# Patient Record
Sex: MALE | Race: WHITE | NOT HISPANIC OR LATINO | URBAN - METROPOLITAN AREA
[De-identification: names, ages, dates, MRNs, and addresses within clinical notes are randomized per-mention and may not be internally consistent; named-entity substitution may affect disease eponyms.]

---

## 2019-01-07 ENCOUNTER — EMERGENCY (EMERGENCY)
Facility: HOSPITAL | Age: 44
LOS: 1 days | Discharge: HOME | End: 2019-01-07
Attending: EMERGENCY MEDICINE | Admitting: EMERGENCY MEDICINE
Payer: COMMERCIAL

## 2019-01-07 VITALS
HEART RATE: 62 BPM | RESPIRATION RATE: 18 BRPM | DIASTOLIC BLOOD PRESSURE: 78 MMHG | SYSTOLIC BLOOD PRESSURE: 119 MMHG | OXYGEN SATURATION: 99 %

## 2019-01-07 VITALS
OXYGEN SATURATION: 99 % | HEART RATE: 68 BPM | WEIGHT: 182.1 LBS | RESPIRATION RATE: 15 BRPM | DIASTOLIC BLOOD PRESSURE: 76 MMHG | SYSTOLIC BLOOD PRESSURE: 112 MMHG | HEIGHT: 70 IN | TEMPERATURE: 99 F

## 2019-01-07 LAB
ALBUMIN SERPL ELPH-MCNC: 4.4 G/DL — SIGNIFICANT CHANGE UP (ref 3.3–5)
ALP SERPL-CCNC: 91 U/L — SIGNIFICANT CHANGE UP (ref 40–120)
ALT FLD-CCNC: 46 U/L — SIGNIFICANT CHANGE UP (ref 12–78)
ANION GAP SERPL CALC-SCNC: 7 MMOL/L — SIGNIFICANT CHANGE UP (ref 5–17)
APPEARANCE UR: CLEAR — SIGNIFICANT CHANGE UP
AST SERPL-CCNC: 28 U/L — SIGNIFICANT CHANGE UP (ref 15–37)
BASOPHILS # BLD AUTO: 0.01 K/UL — SIGNIFICANT CHANGE UP (ref 0–0.2)
BASOPHILS NFR BLD AUTO: 0.2 % — SIGNIFICANT CHANGE UP (ref 0–2)
BILIRUB SERPL-MCNC: 0.9 MG/DL — SIGNIFICANT CHANGE UP (ref 0.2–1.2)
BILIRUB UR-MCNC: NEGATIVE — SIGNIFICANT CHANGE UP
BUN SERPL-MCNC: 23 MG/DL — SIGNIFICANT CHANGE UP (ref 7–23)
CALCIUM SERPL-MCNC: 8.9 MG/DL — SIGNIFICANT CHANGE UP (ref 8.5–10.1)
CHLORIDE SERPL-SCNC: 106 MMOL/L — SIGNIFICANT CHANGE UP (ref 96–108)
CO2 SERPL-SCNC: 29 MMOL/L — SIGNIFICANT CHANGE UP (ref 22–31)
COLOR SPEC: YELLOW — SIGNIFICANT CHANGE UP
CREAT SERPL-MCNC: 1 MG/DL — SIGNIFICANT CHANGE UP (ref 0.5–1.3)
DIFF PNL FLD: NEGATIVE — SIGNIFICANT CHANGE UP
EOSINOPHIL # BLD AUTO: 0.02 K/UL — SIGNIFICANT CHANGE UP (ref 0–0.5)
EOSINOPHIL NFR BLD AUTO: 0.3 % — SIGNIFICANT CHANGE UP (ref 0–6)
GLUCOSE SERPL-MCNC: 65 MG/DL — LOW (ref 70–99)
GLUCOSE UR QL: NEGATIVE — SIGNIFICANT CHANGE UP
HCT VFR BLD CALC: 47.1 % — SIGNIFICANT CHANGE UP (ref 39–50)
HGB BLD-MCNC: 16.6 G/DL — SIGNIFICANT CHANGE UP (ref 13–17)
IMM GRANULOCYTES NFR BLD AUTO: 0.2 % — SIGNIFICANT CHANGE UP (ref 0–1.5)
KETONES UR-MCNC: NEGATIVE — SIGNIFICANT CHANGE UP
LEUKOCYTE ESTERASE UR-ACNC: NEGATIVE — SIGNIFICANT CHANGE UP
LYMPHOCYTES # BLD AUTO: 1.63 K/UL — SIGNIFICANT CHANGE UP (ref 1–3.3)
LYMPHOCYTES # BLD AUTO: 25.5 % — SIGNIFICANT CHANGE UP (ref 13–44)
MCHC RBC-ENTMCNC: 30.9 PG — SIGNIFICANT CHANGE UP (ref 27–34)
MCHC RBC-ENTMCNC: 35.2 GM/DL — SIGNIFICANT CHANGE UP (ref 32–36)
MCV RBC AUTO: 87.7 FL — SIGNIFICANT CHANGE UP (ref 80–100)
MONOCYTES # BLD AUTO: 0.34 K/UL — SIGNIFICANT CHANGE UP (ref 0–0.9)
MONOCYTES NFR BLD AUTO: 5.3 % — SIGNIFICANT CHANGE UP (ref 2–14)
NEUTROPHILS # BLD AUTO: 4.37 K/UL — SIGNIFICANT CHANGE UP (ref 1.8–7.4)
NEUTROPHILS NFR BLD AUTO: 68.5 % — SIGNIFICANT CHANGE UP (ref 43–77)
NITRITE UR-MCNC: NEGATIVE — SIGNIFICANT CHANGE UP
NRBC # BLD: 0 /100 WBCS — SIGNIFICANT CHANGE UP (ref 0–0)
PH UR: 6 — SIGNIFICANT CHANGE UP (ref 5–8)
PLATELET # BLD AUTO: 303 K/UL — SIGNIFICANT CHANGE UP (ref 150–400)
POTASSIUM SERPL-MCNC: 4 MMOL/L — SIGNIFICANT CHANGE UP (ref 3.5–5.3)
POTASSIUM SERPL-SCNC: 4 MMOL/L — SIGNIFICANT CHANGE UP (ref 3.5–5.3)
PROT SERPL-MCNC: 8.2 G/DL — SIGNIFICANT CHANGE UP (ref 6–8.3)
PROT UR-MCNC: NEGATIVE — SIGNIFICANT CHANGE UP
RBC # BLD: 5.37 M/UL — SIGNIFICANT CHANGE UP (ref 4.2–5.8)
RBC # FLD: 12.4 % — SIGNIFICANT CHANGE UP (ref 10.3–14.5)
SODIUM SERPL-SCNC: 142 MMOL/L — SIGNIFICANT CHANGE UP (ref 135–145)
SP GR SPEC: 1.01 — SIGNIFICANT CHANGE UP (ref 1.01–1.02)
UROBILINOGEN FLD QL: NEGATIVE — SIGNIFICANT CHANGE UP
WBC # BLD: 6.38 K/UL — SIGNIFICANT CHANGE UP (ref 3.8–10.5)
WBC # FLD AUTO: 6.38 K/UL — SIGNIFICANT CHANGE UP (ref 3.8–10.5)

## 2019-01-07 PROCEDURE — 96374 THER/PROPH/DIAG INJ IV PUSH: CPT

## 2019-01-07 PROCEDURE — 80053 COMPREHEN METABOLIC PANEL: CPT

## 2019-01-07 PROCEDURE — 96361 HYDRATE IV INFUSION ADD-ON: CPT

## 2019-01-07 PROCEDURE — 81003 URINALYSIS AUTO W/O SCOPE: CPT

## 2019-01-07 PROCEDURE — 36415 COLL VENOUS BLD VENIPUNCTURE: CPT

## 2019-01-07 PROCEDURE — 70450 CT HEAD/BRAIN W/O DYE: CPT

## 2019-01-07 PROCEDURE — 85027 COMPLETE CBC AUTOMATED: CPT

## 2019-01-07 PROCEDURE — 93005 ELECTROCARDIOGRAM TRACING: CPT

## 2019-01-07 PROCEDURE — 99284 EMERGENCY DEPT VISIT MOD MDM: CPT | Mod: 25

## 2019-01-07 PROCEDURE — 99284 EMERGENCY DEPT VISIT MOD MDM: CPT

## 2019-01-07 PROCEDURE — 70450 CT HEAD/BRAIN W/O DYE: CPT | Mod: 26

## 2019-01-07 PROCEDURE — 96375 TX/PRO/DX INJ NEW DRUG ADDON: CPT

## 2019-01-07 RX ORDER — SODIUM CHLORIDE 9 MG/ML
3 INJECTION INTRAMUSCULAR; INTRAVENOUS; SUBCUTANEOUS ONCE
Qty: 0 | Refills: 0 | Status: COMPLETED | OUTPATIENT
Start: 2019-01-07 | End: 2019-01-07

## 2019-01-07 RX ORDER — METOCLOPRAMIDE HCL 10 MG
10 TABLET ORAL ONCE
Qty: 0 | Refills: 0 | Status: COMPLETED | OUTPATIENT
Start: 2019-01-07 | End: 2019-01-07

## 2019-01-07 RX ORDER — FAMOTIDINE 10 MG/ML
20 INJECTION INTRAVENOUS ONCE
Qty: 0 | Refills: 0 | Status: COMPLETED | OUTPATIENT
Start: 2019-01-07 | End: 2019-01-07

## 2019-01-07 RX ORDER — SODIUM CHLORIDE 9 MG/ML
1000 INJECTION INTRAMUSCULAR; INTRAVENOUS; SUBCUTANEOUS
Qty: 0 | Refills: 0 | Status: DISCONTINUED | OUTPATIENT
Start: 2019-01-07 | End: 2019-01-11

## 2019-01-07 RX ORDER — SODIUM CHLORIDE 9 MG/ML
1000 INJECTION INTRAMUSCULAR; INTRAVENOUS; SUBCUTANEOUS ONCE
Qty: 0 | Refills: 0 | Status: COMPLETED | OUTPATIENT
Start: 2019-01-07 | End: 2019-01-07

## 2019-01-07 RX ADMIN — SODIUM CHLORIDE 125 MILLILITER(S): 9 INJECTION INTRAMUSCULAR; INTRAVENOUS; SUBCUTANEOUS at 09:36

## 2019-01-07 RX ADMIN — SODIUM CHLORIDE 3 MILLILITER(S): 9 INJECTION INTRAMUSCULAR; INTRAVENOUS; SUBCUTANEOUS at 09:36

## 2019-01-07 RX ADMIN — Medication 10 MILLIGRAM(S): at 09:37

## 2019-01-07 RX ADMIN — FAMOTIDINE 20 MILLIGRAM(S): 10 INJECTION INTRAVENOUS at 09:36

## 2019-01-07 RX ADMIN — SODIUM CHLORIDE 1000 MILLILITER(S): 9 INJECTION INTRAMUSCULAR; INTRAVENOUS; SUBCUTANEOUS at 09:36

## 2019-01-07 RX ADMIN — SODIUM CHLORIDE 1000 MILLILITER(S): 9 INJECTION INTRAMUSCULAR; INTRAVENOUS; SUBCUTANEOUS at 10:38

## 2019-01-07 RX ADMIN — Medication 104 MILLIGRAM(S): at 09:36

## 2019-01-07 NOTE — ED PROVIDER NOTE - OBJECTIVE STATEMENT
While walking into work about 0800 today, felt lightheaded, dizzy and almost passed out, lasted about 1 hour. In the ED, just fetl dizzy and head pressure and blurry vision.   Similar episodes this past September, had neg holter c his cardiologist.   pt ran marathon, about 15 miles a week, and ran 2 miles this past Friday. While walking into work about 0800 today, felt lightheaded, sob, dizzy and almost passed out, lasted about 1 hour. In the ED, just felt dizzy and head pressure and blurry vision.   Similar episodes this past September, had neg holter c his cardiologist.   pt ran marathon, about 15 miles a week, and ran 2 miles this past Friday. While walking into work about 0800 today, felt lightheaded, sob, dizzy and almost passed out, lasted about 1 hour. In the ED, just felt dizzy and head pressure and blurry vision.   Similar episodes this past September, had neg holter c his cardiologist.   pt ran marathon, about 15 miles a week, and ran 2 miles this past Friday.  no other complaint.

## 2019-01-07 NOTE — ED ADULT NURSE NOTE - NSIMPLEMENTINTERV_GEN_ALL_ED
Implemented All Universal Safety Interventions:  [place] to call system. Call bell, personal items and telephone within reach. Instruct patient to call for assistance. Room bathroom lighting operational. Non-slip footwear when patient is off stretcher. Physically safe environment: no spills, clutter or unnecessary equipment. Stretcher in lowest position, wheels locked, appropriate side rails in place.

## 2019-02-25 ENCOUNTER — EMERGENCY (EMERGENCY)
Facility: HOSPITAL | Age: 44
LOS: 1 days | Discharge: HOME | End: 2019-02-25
Attending: EMERGENCY MEDICINE
Payer: COMMERCIAL

## 2019-02-25 VITALS
RESPIRATION RATE: 20 BRPM | TEMPERATURE: 98 F | HEIGHT: 70 IN | SYSTOLIC BLOOD PRESSURE: 118 MMHG | WEIGHT: 184.97 LBS | DIASTOLIC BLOOD PRESSURE: 85 MMHG | HEART RATE: 77 BPM | OXYGEN SATURATION: 97 %

## 2019-02-25 VITALS
TEMPERATURE: 99 F | DIASTOLIC BLOOD PRESSURE: 96 MMHG | SYSTOLIC BLOOD PRESSURE: 136 MMHG | RESPIRATION RATE: 20 BRPM | OXYGEN SATURATION: 96 % | HEART RATE: 68 BPM

## 2019-02-25 DIAGNOSIS — F40.01 AGORAPHOBIA WITH PANIC DISORDER: ICD-10-CM

## 2019-02-25 LAB
ALBUMIN SERPL ELPH-MCNC: 4.7 G/DL — SIGNIFICANT CHANGE UP (ref 3.3–5.2)
ALP SERPL-CCNC: 84 U/L — SIGNIFICANT CHANGE UP (ref 40–120)
ALT FLD-CCNC: 34 U/L — SIGNIFICANT CHANGE UP
AMPHET UR-MCNC: NEGATIVE — SIGNIFICANT CHANGE UP
ANION GAP SERPL CALC-SCNC: 12 MMOL/L — SIGNIFICANT CHANGE UP (ref 5–17)
APAP SERPL-MCNC: <7.5 UG/ML — LOW (ref 10–26)
AST SERPL-CCNC: 24 U/L — SIGNIFICANT CHANGE UP
BARBITURATES UR SCN-MCNC: NEGATIVE — SIGNIFICANT CHANGE UP
BASOPHILS # BLD AUTO: 0 K/UL — SIGNIFICANT CHANGE UP (ref 0–0.2)
BASOPHILS NFR BLD AUTO: 0.1 % — SIGNIFICANT CHANGE UP (ref 0–2)
BENZODIAZ UR-MCNC: NEGATIVE — SIGNIFICANT CHANGE UP
BILIRUB SERPL-MCNC: 0.6 MG/DL — SIGNIFICANT CHANGE UP (ref 0.4–2)
BUN SERPL-MCNC: 17 MG/DL — SIGNIFICANT CHANGE UP (ref 8–20)
CALCIUM SERPL-MCNC: 9.8 MG/DL — SIGNIFICANT CHANGE UP (ref 8.6–10.2)
CHLORIDE SERPL-SCNC: 102 MMOL/L — SIGNIFICANT CHANGE UP (ref 98–107)
CO2 SERPL-SCNC: 25 MMOL/L — SIGNIFICANT CHANGE UP (ref 22–29)
COCAINE METAB.OTHER UR-MCNC: NEGATIVE — SIGNIFICANT CHANGE UP
CREAT SERPL-MCNC: 0.85 MG/DL — SIGNIFICANT CHANGE UP (ref 0.5–1.3)
EOSINOPHIL # BLD AUTO: 0 K/UL — SIGNIFICANT CHANGE UP (ref 0–0.5)
EOSINOPHIL NFR BLD AUTO: 0.2 % — SIGNIFICANT CHANGE UP (ref 0–5)
ETHANOL SERPL-MCNC: <10 MG/DL — SIGNIFICANT CHANGE UP
GLUCOSE SERPL-MCNC: 80 MG/DL — SIGNIFICANT CHANGE UP (ref 70–115)
HCT VFR BLD CALC: 45.3 % — SIGNIFICANT CHANGE UP (ref 42–52)
HGB BLD-MCNC: 16.1 G/DL — SIGNIFICANT CHANGE UP (ref 14–18)
LYMPHOCYTES # BLD AUTO: 1.5 K/UL — SIGNIFICANT CHANGE UP (ref 1–4.8)
LYMPHOCYTES # BLD AUTO: 15.5 % — LOW (ref 20–55)
MCHC RBC-ENTMCNC: 31.3 PG — HIGH (ref 27–31)
MCHC RBC-ENTMCNC: 35.5 G/DL — SIGNIFICANT CHANGE UP (ref 32–36)
MCV RBC AUTO: 88 FL — SIGNIFICANT CHANGE UP (ref 80–94)
METHADONE UR-MCNC: NEGATIVE — SIGNIFICANT CHANGE UP
MONOCYTES # BLD AUTO: 0.4 K/UL — SIGNIFICANT CHANGE UP (ref 0–0.8)
MONOCYTES NFR BLD AUTO: 4.1 % — SIGNIFICANT CHANGE UP (ref 3–10)
NEUTROPHILS # BLD AUTO: 7.5 K/UL — SIGNIFICANT CHANGE UP (ref 1.8–8)
NEUTROPHILS NFR BLD AUTO: 79.9 % — HIGH (ref 37–73)
OPIATES UR-MCNC: NEGATIVE — SIGNIFICANT CHANGE UP
PCP SPEC-MCNC: SIGNIFICANT CHANGE UP
PCP UR-MCNC: NEGATIVE — SIGNIFICANT CHANGE UP
PLATELET # BLD AUTO: 278 K/UL — SIGNIFICANT CHANGE UP (ref 150–400)
POTASSIUM SERPL-MCNC: 4.1 MMOL/L — SIGNIFICANT CHANGE UP (ref 3.5–5.3)
POTASSIUM SERPL-SCNC: 4.1 MMOL/L — SIGNIFICANT CHANGE UP (ref 3.5–5.3)
PROT SERPL-MCNC: 8.3 G/DL — SIGNIFICANT CHANGE UP (ref 6.6–8.7)
RBC # BLD: 5.15 M/UL — SIGNIFICANT CHANGE UP (ref 4.6–6.2)
RBC # FLD: 12.5 % — SIGNIFICANT CHANGE UP (ref 11–15.6)
SALICYLATES SERPL-MCNC: <0.6 MG/DL — LOW (ref 10–20)
SODIUM SERPL-SCNC: 139 MMOL/L — SIGNIFICANT CHANGE UP (ref 135–145)
THC UR QL: NEGATIVE — SIGNIFICANT CHANGE UP
TSH SERPL-MCNC: 1.08 UIU/ML — SIGNIFICANT CHANGE UP (ref 0.27–4.2)
WBC # BLD: 9.4 K/UL — SIGNIFICANT CHANGE UP (ref 4.8–10.8)
WBC # FLD AUTO: 9.4 K/UL — SIGNIFICANT CHANGE UP (ref 4.8–10.8)

## 2019-02-25 PROCEDURE — 80307 DRUG TEST PRSMV CHEM ANLYZR: CPT

## 2019-02-25 PROCEDURE — 93010 ELECTROCARDIOGRAM REPORT: CPT

## 2019-02-25 PROCEDURE — 90792 PSYCH DIAG EVAL W/MED SRVCS: CPT

## 2019-02-25 PROCEDURE — 85027 COMPLETE CBC AUTOMATED: CPT

## 2019-02-25 PROCEDURE — 93005 ELECTROCARDIOGRAM TRACING: CPT

## 2019-02-25 PROCEDURE — 84443 ASSAY THYROID STIM HORMONE: CPT

## 2019-02-25 PROCEDURE — 99284 EMERGENCY DEPT VISIT MOD MDM: CPT

## 2019-02-25 PROCEDURE — 80053 COMPREHEN METABOLIC PANEL: CPT

## 2019-02-25 RX ORDER — ALPRAZOLAM 0.25 MG
1 TABLET ORAL
Qty: 3 | Refills: 0
Start: 2019-02-25

## 2019-02-25 RX ORDER — ALPRAZOLAM 0.25 MG
0.5 TABLET ORAL ONCE
Qty: 0 | Refills: 0 | Status: DISCONTINUED | OUTPATIENT
Start: 2019-02-25 | End: 2019-02-25

## 2019-02-25 RX ADMIN — Medication 0.5 MILLIGRAM(S): at 13:08

## 2019-02-25 NOTE — ED BEHAVIORAL HEALTH ASSESSMENT NOTE - DESCRIPTION
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

## 2019-02-25 NOTE — ED BEHAVIORAL HEALTH ASSESSMENT NOTE - DETAILS
2 children na neurontin electric shocks. reports ssris made anxiety worse father -anxiety, responded well to lexapro, no family suicide self

## 2019-02-25 NOTE — ED ADULT NURSE NOTE - OBJECTIVE STATEMENT
Patient is alert and verbal, report anxiety, panic attack onset couple weeks ago. anxiety is worse today.

## 2019-02-25 NOTE — ED BEHAVIORAL HEALTH ASSESSMENT NOTE - HPI (INCLUDE ILLNESS QUALITY, SEVERITY, DURATION, TIMING, CONTEXT, MODIFYING FACTORS, ASSOCIATED SIGNS AND SYMPTOMS)
42 yo man, hx of panic disorder, 1 prior psychiatric hospitalization for 2 days 15 years ago for anxiety, no prior suicide attempts, no substance abuse, BIB father referred by self for anxiety.    pt reports long hx of anxiety disorder/panic attacks starting age 17, has never had regular outpatient mental health follow up, tried SSRIs in past. Has been RX xanax in past, for panic attacks which were isolated events usually triggered by something like airplane ride. reports he did not like SSRIs stopped and self treated anxiety for years by running however started feeling dizzy constantly months ago, which made him stop running, after which anxiety /panic worsened.  he reports constant recurrent panic attacks occurring daily lasting up to 45 minutes however for the past day felt he was in unremitting panic and thus came to ED. He reports with attacks he has breathing difficulty, nausea, feeling of doom, palpitations diaphoresis. he reports inter-attack anxiety about recurrence as well as agoraphobia, does leave house however it is with difficulty, has negotiated with job to work from home, denies falling behind on work or calling sick. he denies any particular stressors, reports good relations with family. He denies hx of manic sx, AH, VH , paranoia homicidal ideation. he denies suicidal ideation and states he wants help.  he reports he was Rx antivert but felt it only worsened his headache.  has tried xanax in past but feels it give him a "hangover" feeling although helps with attacks. He denies persistent depressed mood, has difficulty sleeping. he reports he has had extensive medical workup for dizziness however it is negative.     father in ED states patient has been anxious more than ever since he stopped running and developed dizziness, reports pt was diagnosed with vertigo but did not respond well to medication for that. father denies safety concerns for patient to return home and denies he has expressed SI

## 2019-02-25 NOTE — ED BEHAVIORAL HEALTH ASSESSMENT NOTE - MEDICATIONS (PRESCRIPTIONS, DIRECTIONS)
will give 3 pills of xanax 0.5 to hold until intake tomorrow, advised patient/father patient should not drive after taking and should not combine with alcohol

## 2019-02-25 NOTE — ED ADULT TRIAGE NOTE - CHIEF COMPLAINT QUOTE
anxiety and panic attack. not SI, not homicidal. [Negative] : Heme/Lymph [Vision Problems] : vision problems [Joint Pain] : joint pain

## 2019-02-25 NOTE — ED STATDOCS - OBJECTIVE STATEMENT
Pt is a 42 y/o M presenting to the ED with c/o anxiety. Pt states he has been having worsening anxiety over the last two weeks, inducing panic attacks. He reports being home bound as he is terrified to leave, feels panicked, unable to eat or sleep, and is calling his dad 4-5x a day as a result of his anxiety. Unable to determine a trigger. Describes panic attacks as feeling afraid, unable to take a deep breath, having palpitations, tingling in his fingers. He reports having a hx of anxiety and panic attacks about 15 years ago. Was feeling well, on some SSRIs but eventually stopped,  but states three months ago, they began returning and worse in the last two weeks. He reports being able to manage his panic attacks and anxiety previously by exercising and running. No real stressors in his personal life. He is selling his house, but states otherwise there are no marital issues, no issues with his children. He works full time but states that he is working from home a lot more in the last few weeks given anxiety. Denies SI or HI. No hx of psych admission. Pt states he has a scheduled appointment with psych tomorrow but he felt overwhelmed this morning and could not wait. Denies drug or alcohol use.

## 2019-02-25 NOTE — ED BEHAVIORAL HEALTH ASSESSMENT NOTE - SAFETY PLAN DETAILS
advised patient/father to return patient to Ed/call 911 if he develops suicidal or homicidal ideation

## 2019-02-25 NOTE — ED BEHAVIORAL HEALTH ASSESSMENT NOTE - RISK ASSESSMENT
Chronic risk due to hx of anxiety. Acute risk due to sleep difficulty. hOwever no personal or family hx of suicide attempts, denies suicidal ideation, denies homicidal ideation , no hx of aggression, identifies reasons to be alive, engaged in work, not abusing substances reports having supportive family, has dependent children, no guns in home. patient assessed to not be at imminent risk of harm to self or others.

## 2019-02-25 NOTE — ED BEHAVIORAL HEALTH NOTE - BEHAVIORAL HEALTH NOTE
SW Note: Pt was seen by Psychiatry and cleared for discharge home. Dr. Woo is prescribing the pt medication to continue to take at home. The pt stated, with family help, he was able to schedule an appt to see a psychiatrist in the community for tomorrow but felt he could not want to see someone till tomorrow. He plans to continue to keep the appt for tomorrow was is interested in a referral to [facility] because it is closer to his home. Reviewed available intake appts from their ( [facility]) google calendar. The pt accepted 2/28@ 11:30 am. Consent form signed and  mindy sent to [facility] for continued care. Family is here to take the pt home and he plans to have family go with him tomorrow to his doctors appt.

## 2019-02-25 NOTE — ED BEHAVIORAL HEALTH ASSESSMENT NOTE - SUMMARY
44 yo man, hx of panic disorder, 1 prior psychiatric hospitalization for 2 days 15 years ago for anxiety, no prior suicide attempts, no substance abuse, BIB father referred by self for anxiety.

## 2019-02-25 NOTE — ED BEHAVIORAL HEALTH ASSESSMENT NOTE - REFERRAL / APPOINTMENT DETAILS
patent reports intake with psychiatrist tomorrow Diogo Lay in [place] tomorrow 245pm, also refereed to [facility] for access to therapist

## 2019-02-25 NOTE — ED ADULT NURSE NOTE - NSIMPLEMENTINTERV_GEN_ALL_ED
Implemented All Universal Safety Interventions:  [place] to call system. Call bell, personal items and telephone within reach. Instruct patient to call for assistance. Room bathroom lighting operational. Non-slip footwear when patient is off stretcher. Physically safe environment: no spills, clutter or unnecessary equipment. Stretcher in lowest position, wheels locked, appropriate side rails in place.

## 2019-02-25 NOTE — ED STATDOCS - CLINICAL SUMMARY MEDICAL DECISION MAKING FREE TEXT BOX
Will check basic labs, EKG, give xanex. Has appointment scheduled for psych tomorrow. If pt is feeling better after being given xanex, will discharge with short course and advised f/u tomorrow.

## 2019-02-25 NOTE — ED ADULT NURSE REASSESSMENT NOTE - NS ED NURSE REASSESS COMMENT FT1
BH contacted by ER MD De Jesus. pt cleared to go over. escorted by ED tech.
Patient presented in  area for psych evaluation post medical clearance in main ED.  Patient endorse anxiety, poor sleep, and increasing panic attacks.  Patient denies suicidal or homicidal ideation.  No psychotic symptoms.

## 2020-04-29 ENCOUNTER — MESSAGE (OUTPATIENT)
Age: 45
End: 2020-04-29

## 2020-05-04 PROBLEM — F41.9 ANXIETY DISORDER, UNSPECIFIED: Chronic | Status: ACTIVE | Noted: 2019-02-25

## 2020-05-05 ENCOUNTER — APPOINTMENT (OUTPATIENT)
Dept: DISASTER EMERGENCY | Facility: CLINIC | Age: 45
End: 2020-05-05

## 2020-05-06 LAB
SARS-COV-2 IGG SERPL IA-ACNC: <0.1 INDEX
SARS-COV-2 IGG SERPL QL IA: NEGATIVE

## 2022-01-17 ENCOUNTER — INPATIENT (INPATIENT)
Facility: HOSPITAL | Age: 47
LOS: 0 days | Discharge: HOME | DRG: 310 | End: 2022-01-18
Attending: HOSPITALIST | Admitting: STUDENT IN AN ORGANIZED HEALTH CARE EDUCATION/TRAINING PROGRAM
Payer: COMMERCIAL

## 2022-01-17 VITALS
HEIGHT: 70 IN | DIASTOLIC BLOOD PRESSURE: 80 MMHG | RESPIRATION RATE: 20 BRPM | OXYGEN SATURATION: 98 % | WEIGHT: 205.03 LBS | HEART RATE: 79 BPM | SYSTOLIC BLOOD PRESSURE: 130 MMHG | TEMPERATURE: 98 F

## 2022-01-17 DIAGNOSIS — F41.9 ANXIETY DISORDER, UNSPECIFIED: ICD-10-CM

## 2022-01-17 LAB
ALBUMIN SERPL ELPH-MCNC: 4.7 G/DL — SIGNIFICANT CHANGE UP (ref 3.3–5.2)
ALP SERPL-CCNC: 97 U/L — SIGNIFICANT CHANGE UP (ref 40–120)
ALT FLD-CCNC: 44 U/L — HIGH
ANION GAP SERPL CALC-SCNC: 13 MMOL/L — SIGNIFICANT CHANGE UP (ref 5–17)
APTT BLD: 33.2 SEC — SIGNIFICANT CHANGE UP (ref 27.5–35.5)
AST SERPL-CCNC: 29 U/L — SIGNIFICANT CHANGE UP
BASOPHILS # BLD AUTO: 0.02 K/UL — SIGNIFICANT CHANGE UP (ref 0–0.2)
BASOPHILS NFR BLD AUTO: 0.2 % — SIGNIFICANT CHANGE UP (ref 0–2)
BILIRUB SERPL-MCNC: 0.6 MG/DL — SIGNIFICANT CHANGE UP (ref 0.4–2)
BUN SERPL-MCNC: 19.9 MG/DL — SIGNIFICANT CHANGE UP (ref 8–20)
CALCIUM SERPL-MCNC: 9.1 MG/DL — SIGNIFICANT CHANGE UP (ref 8.6–10.2)
CHLORIDE SERPL-SCNC: 104 MMOL/L — SIGNIFICANT CHANGE UP (ref 98–107)
CO2 SERPL-SCNC: 25 MMOL/L — SIGNIFICANT CHANGE UP (ref 22–29)
CREAT SERPL-MCNC: 0.93 MG/DL — SIGNIFICANT CHANGE UP (ref 0.5–1.3)
D DIMER BLD IA.RAPID-MCNC: <150 NG/ML DDU — SIGNIFICANT CHANGE UP
EOSINOPHIL # BLD AUTO: 0.04 K/UL — SIGNIFICANT CHANGE UP (ref 0–0.5)
EOSINOPHIL NFR BLD AUTO: 0.5 % — SIGNIFICANT CHANGE UP (ref 0–6)
GLUCOSE SERPL-MCNC: 105 MG/DL — HIGH (ref 70–99)
HCT VFR BLD CALC: 49.1 % — SIGNIFICANT CHANGE UP (ref 39–50)
HGB BLD-MCNC: 16.7 G/DL — SIGNIFICANT CHANGE UP (ref 13–17)
IMM GRANULOCYTES NFR BLD AUTO: 0.2 % — SIGNIFICANT CHANGE UP (ref 0–1.5)
INR BLD: 1.04 RATIO — SIGNIFICANT CHANGE UP (ref 0.88–1.16)
LYMPHOCYTES # BLD AUTO: 1.62 K/UL — SIGNIFICANT CHANGE UP (ref 1–3.3)
LYMPHOCYTES # BLD AUTO: 18.8 % — SIGNIFICANT CHANGE UP (ref 13–44)
MCHC RBC-ENTMCNC: 30.3 PG — SIGNIFICANT CHANGE UP (ref 27–34)
MCHC RBC-ENTMCNC: 34 GM/DL — SIGNIFICANT CHANGE UP (ref 32–36)
MCV RBC AUTO: 89.1 FL — SIGNIFICANT CHANGE UP (ref 80–100)
MONOCYTES # BLD AUTO: 0.4 K/UL — SIGNIFICANT CHANGE UP (ref 0–0.9)
MONOCYTES NFR BLD AUTO: 4.6 % — SIGNIFICANT CHANGE UP (ref 2–14)
NEUTROPHILS # BLD AUTO: 6.54 K/UL — SIGNIFICANT CHANGE UP (ref 1.8–7.4)
NEUTROPHILS NFR BLD AUTO: 75.7 % — SIGNIFICANT CHANGE UP (ref 43–77)
PLATELET # BLD AUTO: 296 K/UL — SIGNIFICANT CHANGE UP (ref 150–400)
POTASSIUM SERPL-MCNC: 4 MMOL/L — SIGNIFICANT CHANGE UP (ref 3.5–5.3)
POTASSIUM SERPL-SCNC: 4 MMOL/L — SIGNIFICANT CHANGE UP (ref 3.5–5.3)
PROT SERPL-MCNC: 7.6 G/DL — SIGNIFICANT CHANGE UP (ref 6.6–8.7)
PROTHROM AB SERPL-ACNC: 12 SEC — SIGNIFICANT CHANGE UP (ref 10.6–13.6)
RBC # BLD: 5.51 M/UL — SIGNIFICANT CHANGE UP (ref 4.2–5.8)
RBC # FLD: 12.1 % — SIGNIFICANT CHANGE UP (ref 10.3–14.5)
SARS-COV-2 RNA SPEC QL NAA+PROBE: SIGNIFICANT CHANGE UP
SODIUM SERPL-SCNC: 141 MMOL/L — SIGNIFICANT CHANGE UP (ref 135–145)
TROPONIN T SERPL-MCNC: <0.01 NG/ML — SIGNIFICANT CHANGE UP (ref 0–0.06)
TSH SERPL-MCNC: 0.79 UIU/ML — SIGNIFICANT CHANGE UP (ref 0.27–4.2)
WBC # BLD: 8.64 K/UL — SIGNIFICANT CHANGE UP (ref 3.8–10.5)
WBC # FLD AUTO: 8.64 K/UL — SIGNIFICANT CHANGE UP (ref 3.8–10.5)

## 2022-01-17 PROCEDURE — 99285 EMERGENCY DEPT VISIT HI MDM: CPT

## 2022-01-17 PROCEDURE — 99223 1ST HOSP IP/OBS HIGH 75: CPT

## 2022-01-17 PROCEDURE — 93010 ELECTROCARDIOGRAM REPORT: CPT

## 2022-01-17 RX ORDER — SODIUM CHLORIDE 9 MG/ML
1000 INJECTION INTRAMUSCULAR; INTRAVENOUS; SUBCUTANEOUS ONCE
Refills: 0 | Status: COMPLETED | OUTPATIENT
Start: 2022-01-17 | End: 2022-01-17

## 2022-01-17 RX ORDER — ESCITALOPRAM OXALATE 10 MG/1
5 TABLET, FILM COATED ORAL DAILY
Refills: 0 | Status: DISCONTINUED | OUTPATIENT
Start: 2022-01-17 | End: 2022-01-18

## 2022-01-17 RX ORDER — DILTIAZEM HCL 120 MG
30 CAPSULE, EXT RELEASE 24 HR ORAL ONCE
Refills: 0 | Status: COMPLETED | OUTPATIENT
Start: 2022-01-17 | End: 2022-01-17

## 2022-01-17 RX ORDER — APIXABAN 2.5 MG/1
5 TABLET, FILM COATED ORAL ONCE
Refills: 0 | Status: COMPLETED | OUTPATIENT
Start: 2022-01-17 | End: 2022-01-17

## 2022-01-17 RX ORDER — DILTIAZEM HCL 120 MG
30 CAPSULE, EXT RELEASE 24 HR ORAL EVERY 6 HOURS
Refills: 0 | Status: DISCONTINUED | OUTPATIENT
Start: 2022-01-17 | End: 2022-01-18

## 2022-01-17 RX ORDER — APIXABAN 2.5 MG/1
5 TABLET, FILM COATED ORAL EVERY 12 HOURS
Refills: 0 | Status: DISCONTINUED | OUTPATIENT
Start: 2022-01-18 | End: 2022-01-18

## 2022-01-17 RX ADMIN — SODIUM CHLORIDE 1000 MILLILITER(S): 9 INJECTION INTRAMUSCULAR; INTRAVENOUS; SUBCUTANEOUS at 12:37

## 2022-01-17 RX ADMIN — ESCITALOPRAM OXALATE 5 MILLIGRAM(S): 10 TABLET, FILM COATED ORAL at 21:26

## 2022-01-17 RX ADMIN — Medication 30 MILLIGRAM(S): at 23:05

## 2022-01-17 RX ADMIN — APIXABAN 5 MILLIGRAM(S): 2.5 TABLET, FILM COATED ORAL at 15:25

## 2022-01-17 RX ADMIN — Medication 30 MILLIGRAM(S): at 12:53

## 2022-01-17 RX ADMIN — Medication 30 MILLIGRAM(S): at 18:16

## 2022-01-17 NOTE — ED PROVIDER NOTE - PHYSICAL EXAMINATION
Head: atraumatic, normacephalic  Face: atraumatic, no crepitus no orbiral/maxillary/mandibular ttp  throat: uvula midline no exudates  eyes: perrla eomi  heart: irregularly irregular  lungs: ctab  abd: soft, nt nd +bs no rebound/guarding no cva ttp  skin: warm  LE: no swelling, no calf ttp  back: no midline cervical/thoracic/lumbar ttp

## 2022-01-17 NOTE — H&P ADULT - HISTORY OF PRESENT ILLNESS
Patient is a 47 yo M w/ PMH of Anxiety presenting to the ED with chief complaint of palpitations. Patient states he has a long history of palpitations. He was seen by Cardiology and underwent stress test and Holter which was unremarkable. He noticed palpitations again this morning which have been persistent therefore he sought medical care. He denies any fever, chills, chest pain, orthopnea/PND, SOB, cough, wheezing, abdominal pain, nausea, vomiting, diarrhea, constipation, bloody bowel movements, syncopal episodes, calf tenderness.    He was noted to be in AFib w/ RVR by EMS, given Cardizem IV.

## 2022-01-17 NOTE — H&P ADULT - NSHPPHYSICALEXAM_GEN_ALL_CORE
Vital Signs Last 24 Hrs  T(F): 98.1 (17 Jan 2022 11:44), Max: 98.1 (17 Jan 2022 11:44)  HR: 79 (17 Jan 2022 11:44) (79 - 79)  BP: 130/80 (17 Jan 2022 11:44) (130/80 - 130/80)  RR: 20 (17 Jan 2022 11:44) (20 - 20)  SpO2: 98% (17 Jan 2022 11:44) (98% - 98%)    Physical Exam:  Constitutional: NAD  HEENT: NC/AT, PERRL, EOMI, trachea midline, no JVD  Respiratory: CTA bilaterally, symmetrical chest rise  Cardiovascular: Irr/irr, no m/g/r  Gastrointestinal: Soft, NT/ND, BS+  Vascular: 2+ peripheral pulses  Neurological: A/O x 3, no focal neurological deficits  Psych: Fair mood/affect  Musculoskeletal: No edema, cyanosis, deformities. ROM normal  Skin: No obvious rash, lesions. No jaundice.

## 2022-01-17 NOTE — ED ADULT TRIAGE NOTE - CHIEF COMPLAINT QUOTE
pt a+ox3, BIBA c/o new onset rapid afib. states sudden onset of palpitations while sitting on couch watching tv. denies cardiac hx. pt denies chest pain or SOB. rec'd Cardizem 10mg IVP en route by EMS.

## 2022-01-17 NOTE — CONSULT NOTE ADULT - SUBJECTIVE AND OBJECTIVE BOX
[facility]                [address] [place] [address]                                     PHONE: [phone]                                       FAX: [phone]  [email or web address]      Reason for Consult: AF, chest pain     HPI: Mr John is a 45 y/o man with no chronic medical conditions however with family history of ASCVD who presents with sudden onset sustained rapid irregular heart rate associated with chest pressure prompting him to present to the ER where he was noted to have AF with RVR. Prior outpatient mobile telemetry was significant for isolated PVCs. There is no personal history of tachyarrhythmia.      PAST MEDICAL & SURGICAL HISTORY:  No pertinent past medical history  Anxiety    No significant past surgical history    No significant past surgical history        Telemetry: af    PREVIOUS DIAGNOSTIC TESTING:      EKG: AF with RVR    ECHO FINDINGS:  Normal biventricular function without hemodynamically significant valve disease     MEDICATIONS  (STANDING):  Home Medications: none  MEDICATIONS  (PRN):    FAMILY HISTORY:  No pertinent family history in first degree relatives    SOCIAL HISTORY:  CIGARETTES: denies   ALCOHOL: social     ROS: Negative other than as mentioned in HPI.    Vital Signs Last 24 Hrs  T(C): 36.7 (17 Jan 2022 11:44), Max: 36.7 (17 Jan 2022 11:44)  T(F): 98.1 (17 Jan 2022 11:44), Max: 98.1 (17 Jan 2022 11:44)  HR: 79 (17 Jan 2022 11:44) (79 - 79)  BP: 130/80 (17 Jan 2022 11:44) (130/80 - 130/80)  BP(mean): --  RR: 20 (17 Jan 2022 11:44) (20 - 20)  SpO2: 98% (17 Jan 2022 11:44) (98% - 98%)    PHYSICAL EXAM:  General: Appears well developed, well nourished alert and cooperative.  HEENT: Head; normocephalic, atraumatic. sclera anicteric, MMM, no JVD, neck is supple   CARDIOVASCULAR; irregularly irregular, 1/6 JUNIOR, no rub, gallop or lift. Normal S1 and variable  S2.  LUNGS; No rales, rhonchi or wheeze. Normal breath sounds bilaterally.  ABDOMEN ; Soft, nontender without mass or organomegaly. bowel sounds normoactive.  EXTREMITIES; No clubbing, cyanosis or edema. Distal pulses wnl. ROM normal.  SKIN; warm and dry with normal turgor.  NEURO; Alert/oriented x 3/normal motor exam.     PSYCH; normal affect.        I&O's Detail      LABS:                        16.7   8.64  )-----------( 296      ( 17 Jan 2022 12:37 )             49.1     01-17    141  |  104  |  19.9  ----------------------------<  105<H>  4.0   |  25.0  |  0.93    Ca    9.1      17 Jan 2022 12:37    TPro  7.6  /  Alb  4.7  /  TBili  0.6  /  DBili  x   /  AST  29  /  ALT  44<H>  /  AlkPhos  97  01-17    CARDIAC MARKERS ( 17 Jan 2022 12:37 )  x     / <0.01 ng/mL / x     / x     / x          PT/INR - ( 17 Jan 2022 12:37 )   PT: 12.0 sec;   INR: 1.04 ratio         PTT - ( 17 Jan 2022 12:37 )  PTT:33.2 sec    I&O's Summary      RADIOLOGY & ADDITIONAL STUDIES:

## 2022-01-17 NOTE — H&P ADULT - ASSESSMENT
Patient is a 47 yo M w/ PMH of Anxiety presenting to the ED with chief complaint of palpitations.    New Onset AFib w RVR  - Cardiology recs appreciated  - No longer in RVR  - Telemetry  - Eliquis  - Start Diltiazem 30mg q8h w/ holding parameters  - NPO at midnight for SATURNINO/DCCV tomorrow    Anxiety  - Continue Lexapro    DVT ppx: Eliquis

## 2022-01-17 NOTE — ED PROVIDER NOTE - OBJECTIVE STATEMENT
45yo M hx of anxiety on lexapro pw palpitations that started while sitting on a couch 30 mins prior to arrival. notes irregular and fast beat; states that over the last 2 days felt similar but only brief moments when he was swallowing. Denies f/c/n/v/cp/sob// cough/rash/headache/dizziness/abd.pain/d/c/dysuria/hematuria. was seen by cardiology last year bc had brief episodes of skipping beats but nothing was found on holter. no family hx of  arrhythmias. father dies of mi iat age 68 but also had ca and other medical problems. pt is generally healthy never had it prolonged this way. no etoh/drug use. no hx of dvt/pe. no recent travel no sick contacts

## 2022-01-17 NOTE — ED PROVIDER NOTE - CLINICAL SUMMARY MEDICAL DECISION MAKING FREE TEXT BOX
new onset afib given 10mg dilt by ems HR improved into 70s on ekg; will keep on monitor give PO cardizam; labs cardiology consulted --reassess

## 2022-01-17 NOTE — CONSULT NOTE ADULT - ASSESSMENT
Mr John is a 47 y/o man with no chronic medical conditions however with family history of ASCVD who presents with sudden onset sustained rapid irregular heart rate associated with chest pressure prompting him to present to the ER where he was noted to have AF with RVR. Prior outpatient mobile telemetry was significant for isolated PVCs. There is no personal history of tachyarrhythmia.    Symptomatic AF with RVR  - diltiazem 30mg Q8hrs with holding for SBP<110  - symptomatic AF with RVR: plan for SATURNINO/DCCV, NPO after midnight   - start eliquis 5mg Q12 given plan for SATURNINO/DCCV 1/18/22  - TFTs noted  - tele  - outpatient EP re; consideration for ablation

## 2022-01-18 ENCOUNTER — TRANSCRIPTION ENCOUNTER (OUTPATIENT)
Age: 47
End: 2022-01-18

## 2022-01-18 VITALS — SYSTOLIC BLOOD PRESSURE: 128 MMHG | DIASTOLIC BLOOD PRESSURE: 87 MMHG | TEMPERATURE: 98 F | HEART RATE: 77 BPM

## 2022-01-18 LAB
ALBUMIN SERPL ELPH-MCNC: 4.7 G/DL — SIGNIFICANT CHANGE UP (ref 3.3–5.2)
ALP SERPL-CCNC: 97 U/L — SIGNIFICANT CHANGE UP (ref 40–120)
ALT FLD-CCNC: 49 U/L — HIGH
ANION GAP SERPL CALC-SCNC: 12 MMOL/L — SIGNIFICANT CHANGE UP (ref 5–17)
AST SERPL-CCNC: 33 U/L — SIGNIFICANT CHANGE UP
BILIRUB SERPL-MCNC: 1.2 MG/DL — SIGNIFICANT CHANGE UP (ref 0.4–2)
BUN SERPL-MCNC: 19.2 MG/DL — SIGNIFICANT CHANGE UP (ref 8–20)
CALCIUM SERPL-MCNC: 9.3 MG/DL — SIGNIFICANT CHANGE UP (ref 8.6–10.2)
CHLORIDE SERPL-SCNC: 99 MMOL/L — SIGNIFICANT CHANGE UP (ref 98–107)
CO2 SERPL-SCNC: 27 MMOL/L — SIGNIFICANT CHANGE UP (ref 22–29)
CREAT SERPL-MCNC: 0.97 MG/DL — SIGNIFICANT CHANGE UP (ref 0.5–1.3)
GLUCOSE SERPL-MCNC: 103 MG/DL — HIGH (ref 70–99)
HCT VFR BLD CALC: 52.7 % — HIGH (ref 39–50)
HGB BLD-MCNC: 17.8 G/DL — HIGH (ref 13–17)
MAGNESIUM SERPL-MCNC: 2.1 MG/DL — SIGNIFICANT CHANGE UP (ref 1.8–2.6)
MCHC RBC-ENTMCNC: 30.1 PG — SIGNIFICANT CHANGE UP (ref 27–34)
MCHC RBC-ENTMCNC: 33.8 GM/DL — SIGNIFICANT CHANGE UP (ref 32–36)
MCV RBC AUTO: 89 FL — SIGNIFICANT CHANGE UP (ref 80–100)
PHOSPHATE SERPL-MCNC: 2.5 MG/DL — SIGNIFICANT CHANGE UP (ref 2.4–4.7)
PLATELET # BLD AUTO: 302 K/UL — SIGNIFICANT CHANGE UP (ref 150–400)
POTASSIUM SERPL-MCNC: 4.4 MMOL/L — SIGNIFICANT CHANGE UP (ref 3.5–5.3)
POTASSIUM SERPL-SCNC: 4.4 MMOL/L — SIGNIFICANT CHANGE UP (ref 3.5–5.3)
PROT SERPL-MCNC: 7.8 G/DL — SIGNIFICANT CHANGE UP (ref 6.6–8.7)
RBC # BLD: 5.92 M/UL — HIGH (ref 4.2–5.8)
RBC # FLD: 12.4 % — SIGNIFICANT CHANGE UP (ref 10.3–14.5)
SODIUM SERPL-SCNC: 138 MMOL/L — SIGNIFICANT CHANGE UP (ref 135–145)
WBC # BLD: 8.98 K/UL — SIGNIFICANT CHANGE UP (ref 3.8–10.5)
WBC # FLD AUTO: 8.98 K/UL — SIGNIFICANT CHANGE UP (ref 3.8–10.5)

## 2022-01-18 PROCEDURE — G0378: CPT

## 2022-01-18 PROCEDURE — 85610 PROTHROMBIN TIME: CPT

## 2022-01-18 PROCEDURE — 85027 COMPLETE CBC AUTOMATED: CPT

## 2022-01-18 PROCEDURE — U0005: CPT

## 2022-01-18 PROCEDURE — 99285 EMERGENCY DEPT VISIT HI MDM: CPT

## 2022-01-18 PROCEDURE — 93005 ELECTROCARDIOGRAM TRACING: CPT

## 2022-01-18 PROCEDURE — 80053 COMPREHEN METABOLIC PANEL: CPT

## 2022-01-18 PROCEDURE — 99239 HOSP IP/OBS DSCHRG MGMT >30: CPT

## 2022-01-18 PROCEDURE — 84443 ASSAY THYROID STIM HORMONE: CPT

## 2022-01-18 PROCEDURE — 83735 ASSAY OF MAGNESIUM: CPT

## 2022-01-18 PROCEDURE — U0003: CPT

## 2022-01-18 PROCEDURE — 84484 ASSAY OF TROPONIN QUANT: CPT

## 2022-01-18 PROCEDURE — 85730 THROMBOPLASTIN TIME PARTIAL: CPT

## 2022-01-18 PROCEDURE — 84100 ASSAY OF PHOSPHORUS: CPT

## 2022-01-18 PROCEDURE — 85379 FIBRIN DEGRADATION QUANT: CPT

## 2022-01-18 PROCEDURE — 85025 COMPLETE CBC W/AUTO DIFF WBC: CPT

## 2022-01-18 PROCEDURE — 36415 COLL VENOUS BLD VENIPUNCTURE: CPT

## 2022-01-18 RX ORDER — DILTIAZEM HCL 120 MG
1 CAPSULE, EXT RELEASE 24 HR ORAL
Qty: 30 | Refills: 0
Start: 2022-01-18 | End: 2022-02-16

## 2022-01-18 RX ADMIN — APIXABAN 5 MILLIGRAM(S): 2.5 TABLET, FILM COATED ORAL at 05:43

## 2022-01-18 RX ADMIN — Medication 30 MILLIGRAM(S): at 13:06

## 2022-01-18 RX ADMIN — Medication 30 MILLIGRAM(S): at 05:43

## 2022-01-18 NOTE — PROGRESS NOTE ADULT - ASSESSMENT
Patient is a 47 yo M w/ PMH of Anxiety presenting to the ED with chief complaint of palpitations found to have Atrial fib , cardiology consulted     1-New Onset AFib w RVR/ PAF   cardiology consulted   rate controlled   cont telemetry   -on diltiazem and  Eliquis  - NPO for possible  SATURNINO/DCCV     2-Anxiety  - Continue Lexapro    DVT ppx: Eliquis

## 2022-01-18 NOTE — DISCHARGE NOTE PROVIDER - NSDCMRMEDTOKEN_GEN_ALL_CORE_FT
dilTIAZem 120 mg/24 hours oral capsule, extended release: 1 cap(s) orally once a day  Ecotrin Adult Low Strength 81 mg oral delayed release tablet: 1 tab(s) orally once a day  Lexapro 5 mg oral tablet: 1 tab(s) orally once a day

## 2022-01-18 NOTE — DISCHARGE NOTE NURSING/CASE MANAGEMENT/SOCIAL WORK - NSDCPEFALRISK_GEN_ALL_CORE
For information on Fall & Injury Prevention, visit: [email or web address] OR  [email or web address] OR  https://www.cdc.gov/steadi/patient.html

## 2022-01-18 NOTE — DISCHARGE NOTE NURSING/CASE MANAGEMENT/SOCIAL WORK - PATIENT PORTAL LINK FT
You can access the FollowMyHealth Patient Portal offered by [facility] by registering at the following website: [email or web address]. By joining [employer]’s FollowMyHealth portal, you will also be able to view your health information using other applications (apps) compatible with our system.

## 2022-01-18 NOTE — DISCHARGE NOTE PROVIDER - NSDCCPCAREPLAN_GEN_ALL_CORE_FT
PRINCIPAL DISCHARGE DIAGNOSIS  Diagnosis: Paroxysmal atrial fibrillation with RVR  Assessment and Plan of Treatment: new onset , converted to sinus rythm   continue aspirin , cardizem as prescribed   follow up with cardiologist      SECONDARY DISCHARGE DIAGNOSES  Diagnosis: Anxiety  Assessment and Plan of Treatment: continue lexapro

## 2022-01-18 NOTE — PATIENT PROFILE ADULT - FALL HARM RISK - UNIVERSAL INTERVENTIONS
Bed in lowest position, wheels locked, appropriate side rails in place/Call bell, personal items and telephone in reach/Instruct patient to call for assistance before getting out of bed or chair/Non-slip footwear when patient is out of bed/[place] to call system/Physically safe environment - no spills, clutter or unnecessary equipment/Purposeful Proactive Rounding/Room/bathroom lighting operational, light cord in reach

## 2022-01-18 NOTE — PROGRESS NOTE ADULT - SUBJECTIVE AND OBJECTIVE BOX
[facility], [facility]                              [address] [address]                                                 PHONE: [phone]                                                 FAX: [phone]  -----------------------------------------------------------------------------------------------  Pt seen and examined. FU for AF    Overnight events/Complaints: Pt without complains. Feels well.    Vital Signs Last 24 Hrs  T(C): 36.6 (18 Jan 2022 13:03), Max: 37.2 (17 Jan 2022 19:33)  T(F): 97.9 (18 Jan 2022 13:03), Max: 98.9 (17 Jan 2022 19:33)  HR: 79 (18 Jan 2022 13:03) (76 - 92)  BP: 148/75 (18 Jan 2022 13:03) (110/76 - 148/75)  BP(mean): --  RR: 18 (18 Jan 2022 13:03) (18 - 18)  SpO2: 95% (18 Jan 2022 13:03) (94% - 97%)  I&O's Summary      PHYSICAL EXAM:  Constitutional: Appears well; alert and co-operative  HEENT:     Head: Normocephalic and atraumatic  Neck: supple. No JVD  Cardiovascular: regular S1 S2  Respiratory: Lungs clear to auscultation; no crepitations, no wheeze  Gastrointestinal:  Soft, Non-tender, + BS	  Musculoskeletal: Normal range of motion. No edema  Skin: Warm and dry. No cyanosis . No diaphoresis.  Neurologic: Alert oriented to time place and person. Normal strength and no tremor.        LABS:                        17.8   8.98  )-----------( 302      ( 18 Jan 2022 07:52 )             52.7     01-18    138  |  99  |  19.2  ----------------------------<  103<H>  4.4   |  27.0  |  0.97    Ca    9.3      18 Jan 2022 07:52  Phos  2.5     01-18  Mg     2.1     01-18    TPro  7.8  /  Alb  4.7  /  TBili  1.2  /  DBili  x   /  AST  33  /  ALT  49<H>  /  AlkPhos  97  01-18    CARDIAC MARKERS ( 17 Jan 2022 12:37 )  x     / <0.01 ng/mL / x     / x     / x          PT/INR - ( 17 Jan 2022 12:37 )   PT: 12.0 sec;   INR: 1.04 ratio         PTT - ( 17 Jan 2022 12:37 )  PTT:33.2 sec    RADIOLOGY & ADDITIONAL STUDIES: (reviewed)  CXR was independently visualized/reviewed  and demonstrated:     CARDIOLOGY TESTING:(reviewed)     EKG: AF with RVR    ECHO FINDINGS:  Normal biventricular function without hemodynamically significant valve disease     TELEMETRY independently visualized/reviewed and demonstrated : PAF now in NSR    MEDICATIONS:(reviewed)  MEDICATIONS  (STANDING):  apixaban 5 milliGRAM(s) Oral every 12 hours  diltiazem    Tablet 30 milliGRAM(s) Oral every 6 hours  escitalopram 5 milliGRAM(s) Oral daily      ASSESSMENT AND PLAN:    45 y/o man with no chronic medical conditions however with family history of ASCVD who presents with sudden onset sustained rapid irregular heart rate associated with chest pressure prompting him to present to the ER where he was noted to have AF with RVR. Prior outpatient mobile telemetry was significant for isolated PVCs. There is no personal history of tachyarrhythmia.    Symptomatic AF with RVR  - Now in NSR  - Change to diltiazem- mg daily on d/c  - CHADS Vasc score 0. does not need Eliquis. Change to ASA 81 mg daily  - Can be discharged from cardiac standpoint.  - outpatient EP with Dr. Zacarias re; consideration for ablation       
Patient is a 46y old  Male who presents with palpitations / New onset AFib       Patient seen and examined at bedside in am   he is c/o palpitatiosn on off overnight   cardiac monitor at present SR , PAF overnight     no CP , he is sitting in the bed comfortable       ALLERGIES:  No Known Allergies    MEDICATIONS  (STANDING):  apixaban 5 milliGRAM(s) Oral every 12 hours  diltiazem    Tablet 30 milliGRAM(s) Oral every 6 hours  escitalopram 5 milliGRAM(s) Oral daily    MEDICATIONS  (PRN):    Vital Signs Last 24 Hrs  T(F): 98.2 (18 Jan 2022 07:29), Max: 98.9 (17 Jan 2022 19:33)  HR: 81 (18 Jan 2022 07:29) (76 - 92)  BP: 110/76 (18 Jan 2022 07:29) (110/76 - 133/89)  RR: 18 (18 Jan 2022 07:29) (18 - 20)  SpO2: 97% (18 Jan 2022 07:29) (94% - 98%)  I&O's Summary      PHYSICAL EXAM:  General: awake alert , no distress   Neck: supple, no JVD   Lungs: CTA bilateral   Cardio: RRR, S1/S2, No murmur  Abdomen: Soft, Nontender, Nondistended; Bowel sounds present  Extremities: No calf tenderness, No pitting edema  SKin warm , normal color on arms , legs face       LABS:                        17.8   8.98  )-----------( 302      ( 18 Jan 2022 07:52 )             52.7     01-18    138  |  99  |  19.2  ----------------------------<  103  4.4   |  27.0  |  0.97    Ca    9.3      18 Jan 2022 07:52  Phos  2.5     01-18  Mg     2.1     01-18    TPro  7.8  /  Alb  4.7  /  TBili  1.2  /  DBili  x   /  AST  33  /  ALT  49  /  AlkPhos  97  01-18        eGFR if Non : 93 mL/min/1.73M2 (01-18-22 @ 07:52)  eGFR if : 107 mL/min/1.73M2 (01-18-22 @ 07:52)    PT/INR - ( 17 Jan 2022 12:37 )   PT: 12.0 sec;   INR: 1.04 ratio         PTT - ( 17 Jan 2022 12:37 )  PTT:33.2 sec            TSH 0.79   TSH with FT4 reflex --  Total T3 --                      COVID-19 PCR: NotDetec (01-17-22 @ 15:36)    RADIOLOGY & ADDITIONAL TESTS:

## 2022-01-18 NOTE — DISCHARGE NOTE PROVIDER - HOSPITAL COURSE
45 y/o man with no chronic medical conditions however with family history of ASCVD who presents with sudden onset sustained rapid irregular heart rate associated with chest pressure prompting him to present to the ER where he was noted to have AF with RVR. Prior outpatient mobile telemetry was significant for isolated PVCs. There is no personal history of tachyarrhythmia.Pt was seen by cardiology , started on cardizem po with anticoagulation , plan next day for SATURNINO / CV if remains in atrial fib   Pt is converted to NSR next am , SATURNINO canceled CHADs score 0 per cardiology , no indication for anticoagulation stop on discharge   DC home with aspirin and cardizem 120 mg daily   Follow up with cardiologist   total time spend coordinating care 35 min

## 2022-01-19 ENCOUNTER — TRANSCRIPTION ENCOUNTER (OUTPATIENT)
Age: 47
End: 2022-01-19

## 2022-01-19 PROBLEM — Z00.00 ENCOUNTER FOR PREVENTIVE HEALTH EXAMINATION: Status: ACTIVE | Noted: 2022-01-19

## 2022-02-09 ENCOUNTER — EMERGENCY (EMERGENCY)
Facility: HOSPITAL | Age: 47
LOS: 1 days | Discharge: HOME | End: 2022-02-09
Attending: EMERGENCY MEDICINE
Payer: COMMERCIAL

## 2022-02-09 VITALS
RESPIRATION RATE: 18 BRPM | OXYGEN SATURATION: 96 % | HEART RATE: 70 BPM | TEMPERATURE: 98 F | DIASTOLIC BLOOD PRESSURE: 80 MMHG | SYSTOLIC BLOOD PRESSURE: 122 MMHG

## 2022-02-09 VITALS
WEIGHT: 205.03 LBS | OXYGEN SATURATION: 95 % | DIASTOLIC BLOOD PRESSURE: 79 MMHG | SYSTOLIC BLOOD PRESSURE: 125 MMHG | HEIGHT: 70 IN | RESPIRATION RATE: 18 BRPM | HEART RATE: 107 BPM | TEMPERATURE: 98 F

## 2022-02-09 LAB
ALBUMIN SERPL ELPH-MCNC: 5.1 G/DL — SIGNIFICANT CHANGE UP (ref 3.3–5.2)
ALP SERPL-CCNC: 98 U/L — SIGNIFICANT CHANGE UP (ref 40–120)
ALT FLD-CCNC: 56 U/L — HIGH
ANION GAP SERPL CALC-SCNC: 13 MMOL/L — SIGNIFICANT CHANGE UP (ref 5–17)
APPEARANCE UR: CLEAR — SIGNIFICANT CHANGE UP
APTT BLD: 36.3 SEC — HIGH (ref 27.5–35.5)
AST SERPL-CCNC: 38 U/L — SIGNIFICANT CHANGE UP
BASOPHILS # BLD AUTO: 0.02 K/UL — SIGNIFICANT CHANGE UP (ref 0–0.2)
BASOPHILS NFR BLD AUTO: 0.3 % — SIGNIFICANT CHANGE UP (ref 0–2)
BILIRUB SERPL-MCNC: 1 MG/DL — SIGNIFICANT CHANGE UP (ref 0.4–2)
BILIRUB UR-MCNC: NEGATIVE — SIGNIFICANT CHANGE UP
BUN SERPL-MCNC: 19.1 MG/DL — SIGNIFICANT CHANGE UP (ref 8–20)
CALCIUM SERPL-MCNC: 9.4 MG/DL — SIGNIFICANT CHANGE UP (ref 8.6–10.2)
CHLORIDE SERPL-SCNC: 99 MMOL/L — SIGNIFICANT CHANGE UP (ref 98–107)
CO2 SERPL-SCNC: 24 MMOL/L — SIGNIFICANT CHANGE UP (ref 22–29)
COLOR SPEC: YELLOW — SIGNIFICANT CHANGE UP
CREAT SERPL-MCNC: 1 MG/DL — SIGNIFICANT CHANGE UP (ref 0.5–1.3)
DIFF PNL FLD: NEGATIVE — SIGNIFICANT CHANGE UP
EOSINOPHIL # BLD AUTO: 0.02 K/UL — SIGNIFICANT CHANGE UP (ref 0–0.5)
EOSINOPHIL NFR BLD AUTO: 0.3 % — SIGNIFICANT CHANGE UP (ref 0–6)
FLUAV AG NPH QL: SIGNIFICANT CHANGE UP
FLUBV AG NPH QL: SIGNIFICANT CHANGE UP
GLUCOSE SERPL-MCNC: 99 MG/DL — SIGNIFICANT CHANGE UP (ref 70–99)
GLUCOSE UR QL: NEGATIVE MG/DL — SIGNIFICANT CHANGE UP
HCT VFR BLD CALC: 51.2 % — HIGH (ref 39–50)
HGB BLD-MCNC: 17.6 G/DL — HIGH (ref 13–17)
IMM GRANULOCYTES NFR BLD AUTO: 0.1 % — SIGNIFICANT CHANGE UP (ref 0–1.5)
INR BLD: 1.1 RATIO — SIGNIFICANT CHANGE UP (ref 0.88–1.16)
KETONES UR-MCNC: NEGATIVE — SIGNIFICANT CHANGE UP
LEUKOCYTE ESTERASE UR-ACNC: NEGATIVE — SIGNIFICANT CHANGE UP
LYMPHOCYTES # BLD AUTO: 1.58 K/UL — SIGNIFICANT CHANGE UP (ref 1–3.3)
LYMPHOCYTES # BLD AUTO: 21.8 % — SIGNIFICANT CHANGE UP (ref 13–44)
MAGNESIUM SERPL-MCNC: 2.1 MG/DL — SIGNIFICANT CHANGE UP (ref 1.6–2.6)
MCHC RBC-ENTMCNC: 30.1 PG — SIGNIFICANT CHANGE UP (ref 27–34)
MCHC RBC-ENTMCNC: 34.4 GM/DL — SIGNIFICANT CHANGE UP (ref 32–36)
MCV RBC AUTO: 87.7 FL — SIGNIFICANT CHANGE UP (ref 80–100)
MONOCYTES # BLD AUTO: 0.32 K/UL — SIGNIFICANT CHANGE UP (ref 0–0.9)
MONOCYTES NFR BLD AUTO: 4.4 % — SIGNIFICANT CHANGE UP (ref 2–14)
NEUTROPHILS # BLD AUTO: 5.3 K/UL — SIGNIFICANT CHANGE UP (ref 1.8–7.4)
NEUTROPHILS NFR BLD AUTO: 73.1 % — SIGNIFICANT CHANGE UP (ref 43–77)
NITRITE UR-MCNC: NEGATIVE — SIGNIFICANT CHANGE UP
NT-PROBNP SERPL-SCNC: 64 PG/ML — SIGNIFICANT CHANGE UP (ref 0–300)
PH UR: 7 — SIGNIFICANT CHANGE UP (ref 5–8)
PLATELET # BLD AUTO: 309 K/UL — SIGNIFICANT CHANGE UP (ref 150–400)
POTASSIUM SERPL-MCNC: 4.8 MMOL/L — SIGNIFICANT CHANGE UP (ref 3.5–5.3)
POTASSIUM SERPL-SCNC: 4.8 MMOL/L — SIGNIFICANT CHANGE UP (ref 3.5–5.3)
PROT SERPL-MCNC: 8.2 G/DL — SIGNIFICANT CHANGE UP (ref 6.6–8.7)
PROT UR-MCNC: NEGATIVE — SIGNIFICANT CHANGE UP
PROTHROM AB SERPL-ACNC: 12.7 SEC — SIGNIFICANT CHANGE UP (ref 10.6–13.6)
RBC # BLD: 5.84 M/UL — HIGH (ref 4.2–5.8)
RBC # FLD: 12.2 % — SIGNIFICANT CHANGE UP (ref 10.3–14.5)
RSV RNA NPH QL NAA+NON-PROBE: SIGNIFICANT CHANGE UP
SARS-COV-2 RNA SPEC QL NAA+PROBE: SIGNIFICANT CHANGE UP
SODIUM SERPL-SCNC: 136 MMOL/L — SIGNIFICANT CHANGE UP (ref 135–145)
SP GR SPEC: 1.01 — SIGNIFICANT CHANGE UP (ref 1.01–1.02)
TROPONIN T SERPL-MCNC: <0.01 NG/ML — SIGNIFICANT CHANGE UP (ref 0–0.06)
UROBILINOGEN FLD QL: NEGATIVE MG/DL — SIGNIFICANT CHANGE UP
WBC # BLD: 7.25 K/UL — SIGNIFICANT CHANGE UP (ref 3.8–10.5)
WBC # FLD AUTO: 7.25 K/UL — SIGNIFICANT CHANGE UP (ref 3.8–10.5)

## 2022-02-09 PROCEDURE — 85730 THROMBOPLASTIN TIME PARTIAL: CPT

## 2022-02-09 PROCEDURE — 93010 ELECTROCARDIOGRAM REPORT: CPT

## 2022-02-09 PROCEDURE — 85610 PROTHROMBIN TIME: CPT

## 2022-02-09 PROCEDURE — 84484 ASSAY OF TROPONIN QUANT: CPT

## 2022-02-09 PROCEDURE — 71046 X-RAY EXAM CHEST 2 VIEWS: CPT

## 2022-02-09 PROCEDURE — 85025 COMPLETE CBC W/AUTO DIFF WBC: CPT

## 2022-02-09 PROCEDURE — 87637 SARSCOV2&INF A&B&RSV AMP PRB: CPT

## 2022-02-09 PROCEDURE — 81003 URINALYSIS AUTO W/O SCOPE: CPT

## 2022-02-09 PROCEDURE — 99285 EMERGENCY DEPT VISIT HI MDM: CPT | Mod: 25

## 2022-02-09 PROCEDURE — 36415 COLL VENOUS BLD VENIPUNCTURE: CPT

## 2022-02-09 PROCEDURE — 71046 X-RAY EXAM CHEST 2 VIEWS: CPT | Mod: 26

## 2022-02-09 PROCEDURE — 83880 ASSAY OF NATRIURETIC PEPTIDE: CPT

## 2022-02-09 PROCEDURE — 93005 ELECTROCARDIOGRAM TRACING: CPT

## 2022-02-09 PROCEDURE — 99285 EMERGENCY DEPT VISIT HI MDM: CPT

## 2022-02-09 PROCEDURE — 83735 ASSAY OF MAGNESIUM: CPT

## 2022-02-09 PROCEDURE — 80053 COMPREHEN METABOLIC PANEL: CPT

## 2022-02-09 RX ORDER — ESCITALOPRAM OXALATE 10 MG/1
1 TABLET, FILM COATED ORAL
Qty: 0 | Refills: 0 | DISCHARGE

## 2022-02-09 RX ORDER — ASPIRIN/CALCIUM CARB/MAGNESIUM 324 MG
1 TABLET ORAL
Qty: 0 | Refills: 0 | DISCHARGE

## 2022-02-09 RX ORDER — METOPROLOL TARTRATE 50 MG
50 TABLET ORAL ONCE
Refills: 0 | Status: DISCONTINUED | OUTPATIENT
Start: 2022-02-09 | End: 2022-02-09

## 2022-02-09 NOTE — ED ADULT NURSE REASSESSMENT NOTE - NS ED NURSE REASSESS COMMENT FT1
Pt HR in 80s. Dr. Burrell made aware. Repeat EKG ordered. Pt in NSR as noted on EKG and cardiac monitor. Pt states feeling much better. Awaiting cardiology

## 2022-02-09 NOTE — ED ADULT NURSE NOTE - CHIEF COMPLAINT QUOTE
patient c/o palpitations, has halter monitor. patient had an a-fib attack last time and feels the same, Cardiologist is MD Zacarias from [facility].

## 2022-02-09 NOTE — ED PROVIDER NOTE - OBJECTIVE STATEMENT
57yo M with depression, A fib dx 2 years ago had episode of RVR 2 weeks ago d/c on cardizem and ASA, told by [facility] cardiologist to stop patient has halter monitor on. today felt fine then stood up and had episode of tachyardia and felt uncomfortable with how fast it was going,= not tightness/pressure, nonradiating. significantly improved at this time. feels some intermittent tachycardia at times. no recent illness. no change in meds besides stopping cardizem last week. no fevers. no leg swelling. no travel.

## 2022-02-09 NOTE — ED PROVIDER NOTE - NSFOLLOWUPINSTRUCTIONS_ED_ALL_ED_FT
1. Return to ED for worsening, progressive or any other concerning symptoms   2. Follow up with your primary care doctor in 2-3days   3. Restart the 120mg of CArdizem ER once a day   4. Follow up with your cardiologist to further evaluate your atrial fibrillation     Palpitations    A palpitation is the feeling that your heartbeat is irregular or is faster than normal. It may feel like your heart is fluttering or skipping a beat. They may be caused by many things, including smoking, caffeine, alcohol, stress, and certain medicines. Although most causes of palpitations are not serious, palpitations can be a sign of a serious medical problem. Avoid caffeine, alcohol, and tobacco products at home. Try to reduce stress and anxiety and make sure to get plenty of rest.     SEEK IMMEDIATE MEDICAL CARE IF YOU HAVE ANY OF THE FOLLOWING SYMPTOMS: chest pain, shortness of breath, severe headache, dizziness/lightheadedness, or fainting.

## 2022-02-09 NOTE — ED PROVIDER NOTE - PATIENT PORTAL LINK FT
You can access the FollowMyHealth Patient Portal offered by [facility] by registering at the following website: [email or web address]. By joining [employer]’s FollowMyHealth portal, you will also be able to view your health information using other applications (apps) compatible with our system.

## 2022-02-09 NOTE — ED PROVIDER NOTE - PHYSICAL EXAMINATION
Gen: NAD, AOx3  Head: NCAT  HEENT: EOMI, oral mucosa moist, normal conjunctiva, neck supple  Lung: CTAB, no respiratory distress  CV: irregularly irregular, mildly tachycardic, no murmur, Normal perfusion  Abd: soft, NTND  MSK: No edema, no visible deformities  Neuro: No focal neurologic deficits  Skin: No rash   Psych: normal affect

## 2022-02-09 NOTE — ED ADULT NURSE NOTE - OBJECTIVE STATEMENT
Pt received on cardiac monitor and pulse ox in [facility]. Arrives c/o heart palpitations onset this AM while feeding his cat. Has hx of a-fib onset 2 years ago. Last incident being 2 wks ago. Was seen here and fu'd with cardiologist who sent him with a holter monitor and told him to stop his Cardizem to catch any further episodes. Arrives c/o feeling "uncomfortable" in his chest area. Controlled a-fib on monitor.

## 2022-02-09 NOTE — ED PROVIDER NOTE - PROGRESS NOTE DETAILS
converted spontaneously, metoprolol held -Slowey DO remains in sinus, spoke with Dr. Pedro goodman restarting 120mg cardizem ER, patient still has Rx, patient to Follow up with EP specialist for further w/u and testing. -Indra WILLIAMSON

## 2022-02-09 NOTE — ED PROVIDER NOTE - CLINICAL SUMMARY MEDICAL DECISION MAKING FREE TEXT BOX
patient with a fib, HR from  at times during interview. BP stable. will give PO metoprolol. labs. discuss with [facility] regarding dispo whether they want to cardiovert/ablate or Follow up outpt

## 2022-02-09 NOTE — ED ADULT TRIAGE NOTE - HAVE YOU HAD A FIRST COVID-19 BOOSTER?
Refill request received from pharmacy for amlodipine-valsartan. Script was refilled per protocol.  
No

## 2022-02-09 NOTE — ED ADULT TRIAGE NOTE - CHIEF COMPLAINT QUOTE
patient c/o palpitations, has halter monitor. patient had an a-fib attack last time and feels the same, Cardiologist is MD Zacarias from [facility].

## 2022-12-01 NOTE — PATIENT PROFILE ADULT - FUNCTIONAL ASSESSMENT - DAILY ACTIVITY 5.
Quality 130: Documentation Of Current Medications In The Medical Record: Current Medications Documented
Detail Level: Detailed
Quality 110: Preventive Care And Screening: Influenza Immunization: Influenza Immunization previously received during influenza season
Quality 226: Preventive Care And Screening: Tobacco Use: Screening And Cessation Intervention: Patient screened for tobacco use and is an ex/non-smoker
Quality 111:Pneumonia Vaccination Status For Older Adults: Pneumococcal vaccine (PPSV23) administered on or after patient’s 60th birthday and before the end of the measurement period
4 = No assist / stand by assistance

## 2023-03-14 ENCOUNTER — OFFICE (OUTPATIENT)
Dept: URBAN - METROPOLITAN AREA CLINIC 113 | Facility: CLINIC | Age: 48
Setting detail: OPHTHALMOLOGY
End: 2023-03-14
Payer: COMMERCIAL

## 2023-03-14 DIAGNOSIS — H04.123: ICD-10-CM

## 2023-03-14 DIAGNOSIS — H43.392: ICD-10-CM

## 2023-03-14 DIAGNOSIS — D31.32: ICD-10-CM

## 2023-03-14 DIAGNOSIS — H52.7: ICD-10-CM

## 2023-03-14 PROCEDURE — 92015 DETERMINE REFRACTIVE STATE: CPT | Performed by: STUDENT IN AN ORGANIZED HEALTH CARE EDUCATION/TRAINING PROGRAM

## 2023-03-14 PROCEDURE — 92014 COMPRE OPH EXAM EST PT 1/>: CPT | Performed by: STUDENT IN AN ORGANIZED HEALTH CARE EDUCATION/TRAINING PROGRAM

## 2023-03-14 ASSESSMENT — REFRACTION_CURRENTRX
OD_CYLINDER: -0.75
OD_SPHERE: PLANO
OS_SPHERE: PLANO
OD_OVR_VA: 20/
OD_AXIS: 103
OS_CYLINDER: -0.75
OD_VPRISM_DIRECTION: SV
OS_VPRISM_DIRECTION: SV
OS_AXIS: 082
OS_OVR_VA: 20/

## 2023-03-14 ASSESSMENT — KERATOMETRY
OD_K2POWER_DIOPTERS: 44.75
OS_K1POWER_DIOPTERS: 44.00
OD_K1POWER_DIOPTERS: 43.75
OS_K2POWER_DIOPTERS: 44.25
OS_AXISANGLE_DEGREES: 175
OD_AXISANGLE_DEGREES: 029

## 2023-03-14 ASSESSMENT — REFRACTION_MANIFEST
OS_AXIS: 083
OD_CYLINDER: -1.00
OS_VA1: 20/20
OS_SPHERE: PL
OD_AXIS: 109
OD_CYLINDER: -1.25
OS_VA1: 20/20
OS_CYLINDER: -0.75
OS_SPHERE: +0.50
OD_CYLINDER: -1.75
OD_AXIS: 105
OS_CYLINDER: -0.75
OS_HPRISM: 1
OS_CYLINDER: -1.25
OD_VA1: 20/20
OS_VPRISM_DIRECTION: BI
OS_AXIS: 080
OD_SPHERE: PL
OU_VA: 20/20
OD_SPHERE: PLANO
OD_VA1: 20/20
OS_AXIS: 080
OD_SPHERE: +0.50
OD_ADD: +1.00
OS_VA2: 20/20
OS_ADD: +1.00
OD_HPRISM: 1
OS_SPHERE: PLANO
OD_VPRISM_DIRECTION: BI
OD_VA2: 20/20
OD_AXIS: 105

## 2023-03-14 ASSESSMENT — CONFRONTATIONAL VISUAL FIELD TEST (CVF)
OD_FINDINGS: FULL
OS_FINDINGS: FULL

## 2023-03-14 ASSESSMENT — VISUAL ACUITY
OD_BCVA: 20/20
OS_BCVA: 20/20

## 2023-03-14 ASSESSMENT — AXIALLENGTH_DERIVED
OD_AL: 23.4632
OD_AL: 23.4632
OS_AL: 23.4124
OS_AL: 23.4124

## 2023-03-14 ASSESSMENT — SPHEQUIV_DERIVED
OD_SPHEQUIV: -0.375
OD_SPHEQUIV: -0.375
OS_SPHEQUIV: -0.125
OS_SPHEQUIV: -0.125

## 2023-03-14 ASSESSMENT — REFRACTION_AUTOREFRACTION
OD_AXIS: 109
OS_CYLINDER: -1.25
OD_SPHERE: +0.50
OS_SPHERE: +0.50
OS_AXIS: 083
OD_CYLINDER: -1.75

## 2023-03-14 ASSESSMENT — TONOMETRY
OD_IOP_MMHG: 16
OS_IOP_MMHG: 16

## 2023-04-03 ENCOUNTER — OFFICE (OUTPATIENT)
Dept: URBAN - METROPOLITAN AREA CLINIC 113 | Facility: CLINIC | Age: 48
Setting detail: OPHTHALMOLOGY
End: 2023-04-03
Payer: COMMERCIAL

## 2023-04-03 DIAGNOSIS — H52.7: ICD-10-CM

## 2023-04-03 PROCEDURE — N/C NO CHARGE: Performed by: STUDENT IN AN ORGANIZED HEALTH CARE EDUCATION/TRAINING PROGRAM

## 2023-04-03 ASSESSMENT — REFRACTION_AUTOREFRACTION
OS_AXIS: 083
OD_SPHERE: +0.50
OD_CYLINDER: -1.75
OS_CYLINDER: -1.25
OS_SPHERE: +0.50
OD_AXIS: 109

## 2023-04-03 ASSESSMENT — TONOMETRY
OD_IOP_MMHG: 20
OS_IOP_MMHG: 17

## 2023-04-03 ASSESSMENT — REFRACTION_CURRENTRX
OD_OVR_VA: 20/
OD_AXIS: 097
OS_OVR_VA: 20/
OD_SPHERE: PLANO
OD_VPRISM_DIRECTION: SV
OS_OVR_VA: 20/
OS_VPRISM_DIRECTION: SV
OD_SPHERE: PLANO
OD_CYLINDER: -0.75
OS_OVR_VA: 20/
OD_AXIS: 118
OS_AXIS: 083
OS_CYLINDER: -0.75
OS_SPHERE: +0.50
OD_CYLINDER: -1.00
OS_SPHERE: PLANO
OS_CYLINDER: -1.25
OD_VPRISM_DIRECTION: SV
OS_VPRISM_DIRECTION: SV
OD_AXIS: 103
OD_SPHERE: +0.50
OS_CYLINDER: -0.75
OS_SPHERE: PLANO
OS_AXIS: 082
OD_CYLINDER: -1.75
OD_OVR_VA: 20/
OS_AXIS: 082
OD_OVR_VA: 20/

## 2023-04-03 ASSESSMENT — REFRACTION_MANIFEST
OD_ADD: +1.00
OS_AXIS: 080
OS_VPRISM_DIRECTION: BI
OS_SPHERE: PL
OD_SPHERE: PL
OD_VPRISM_DIRECTION: BI
OD_AXIS: 105
OD_VPRISM_DIRECTION: BI
OS_AXIS: 080
OD_AXIS: 105
OD_AXIS: 105
OD_CYLINDER: -1.00
OS_AXIS: 080
OS_VA1: 20/20
OS_HPRISM: 1
OD_CYLINDER: -1.25
OS_CYLINDER: -0.75
OS_ADD: +1.00
OD_CYLINDER: -1.00
OS_CYLINDER: -0.75
OD_VA2: 20/20
OS_CYLINDER: -0.75
OS_VPRISM_DIRECTION: BI
OS_VA2: 20/20
OD_VA1: 20/20
OS_SPHERE: PLANO
OD_HPRISM: 1
OD_HPRISM: 1
OD_SPHERE: PLANO
OS_HPRISM: 1
OD_SPHERE: PL
OS_SPHERE: PL

## 2023-04-03 ASSESSMENT — KERATOMETRY
OS_K2POWER_DIOPTERS: 44.25
OD_AXISANGLE_DEGREES: 029
OS_AXISANGLE_DEGREES: 175
OD_K1POWER_DIOPTERS: 43.75
OS_K1POWER_DIOPTERS: 44.00
OD_K2POWER_DIOPTERS: 44.75

## 2023-04-03 ASSESSMENT — SPHEQUIV_DERIVED
OD_SPHEQUIV: -0.375
OS_SPHEQUIV: -0.125

## 2023-04-03 ASSESSMENT — VISUAL ACUITY
OD_BCVA: 20/20
OS_BCVA: 20/20

## 2023-04-03 ASSESSMENT — AXIALLENGTH_DERIVED
OD_AL: 23.4632
OS_AL: 23.4124

## 2025-02-05 NOTE — ED PROVIDER NOTE - RATE
62 Detail Level: Generalized General Sunscreen Counseling: I recommended a broad spectrum sunscreen with a SPF of 30 or higher.  I explained that SPF 30 sunscreens block approximately 97 percent of the sun's harmful rays.  Sunscreens should be applied at least 15 minutes prior to expected sun exposure and then every 2 hours after that as long as sun exposure continues. If swimming or exercising sunscreen should be reapplied every 45 minutes to an hour after getting wet or sweating.  One ounce, or the equivalent of a shot glass full of sunscreen, is adequate to protect the skin not covered by a bathing suit. I also recommended a lip balm with a sunscreen as well. Sun protective clothing can be used in lieu of sunscreen but must be worn the entire time you are exposed to the sun's rays. \\n\\nI counseled the patient regarding the following:\\nThe ABCDEs of melanoma were reviewed with the patient, and the importance of monthly self-examination of moles was emphasized.  Should any moles change in shape or color, or itch, bleed or burn, pt will contact our office for evaluation sooner then their interval appointment.\\nPatient with multiple nevi. Recommend self exam and for patient to return if any mole changes or symptomatic. FBSE on annual basis recommend.

## 2025-05-02 NOTE — ED ADULT NURSE NOTE - CAS DISCH BELONGINGS RETURNED
Orders or Recommendations From Today's Visit:  I recommend an over-the-counter iron supplement to correct your ezwjwz-[id number]-fi with primary care or oncology regarding this    We have ordered an insleep study to evaluate for sleep apnea.  If we have a reason this is denied, we will order an in lab study for you    To try to keep a 2-week sleep log.  When you finish this, please feel free to drop it off in clinic, send me pictures, or mail it to my clinic    You were referred to sleep psychology     Please review general sleep hygiene recommendations below  We recommend ongoing efforts toward healthy through diet and exercise  Orders Placed This Encounter    SERVICE TO BEHAVIORAL HEALTH    SERVICE TO BEHAVIORAL HEALTH    POLYSOMNOGRAPHY 4 OR MORE PARAMETERS       Thank you for choosing the Pulmonary Services Department, at [facility], as your Pulmonary Specialists.  We actively use feedback to constantly improve and deliver the best care possible. To provide the best experience, we are collecting feedback from you on how we performed.  You may receive a survey in the mail or through your e-mail to evaluate how we did. Please take a moment and share your thoughts.      If for any reason you feel that we did not meet your expectations or you want to share a positive experience, please give us a call. Your feedback helps us know how we are doing and what we can be doing better.    Office hours: 8:00 am to 4:30 pm, Monday - Friday  Phone: [phone]      YOUR TEST RESULTS    If you have any concerns regarding any testing ordered with your visit, please contact our office at the above number.    Otherwise, your test results will be communicated to you in one of the following ways:    - Follow-up office visit  - Phone call  - Through [facility]  - Mailed letter        Thank You,  Rachell Luna NP                                                                                                                                                                                                                                [phone]     AFTER YOU LEAVE THIS OFFICE  The sleep lab will contact you to arrange your sleep study after they check with your insurance for coverage.  Expect this process to take up to 4 weeks.    You will be called within 7-10 business  days after completing your sleep study with the results.    If your sleep study is negative for sleep apnea, we can discuss alternative therapy at your follow up appointment.  If your sleep study is positive for sleep apnea we will arrange for you to start CPAP/Bipap therapy prior to your follow up appointment.      [facility] [place] [address]  Phone [phone]    Monitoring Your Sleep: Home Sleep Study  A home sleep study tracks and records body functions while you’re asleep in your own bed. The results of the study will help with diagnosing your sleep problem and planning your treatment.    How a Home Sleep Study Works  During a sleep study, sensors attached to your body measure your breathing, oxygen level, and other body functions. You will be shown how to attach the sensors to your body. You may also have help from a technician. At bedtime you plug the sensors into a small computer and turn it on. In the morning, you will remove the sensors and return the computer so the results can be studied.  Tips  You’ll be given instructions for how to set up the sensors and the computer. Doing so will be simple. For best results:  Go through the instructions during the day so you’ll be ready to use the equipment at bedtime.  Stick to your normal routine. Ask your healthcare provider if you should do anything differently the night of the study.  If you get up during the night, reconnect the sensors to the computer or to yourself correctly.  Get as many hours of sleep as you  can.  Getting the Results  The results of your sleep study need to be scored and interpreted. Once this is done, your doctor will discuss the findings with you. The sleep study results will show whether you have apnea. It can also tell how severe the apnea is. The findings help your doctor know which treatment or treatments may be the right ones for you.  © 1520-8658 [employer], [address] [place] [address], [place] [address]. All rights reserved. This information is not intended as a substitute for professional medical care. Always follow your healthcare professional's instructions.      INSURANCE COMPLIANCE REQUIREMENTS  AFTER STARTING CPAP/BIPAP THERAPY    Most insurance companies especially Medicare will stop paying for your medical equipment if you are not compliant.    WHAT YOU NEED TO DO TO BE COMPLIANT    The device must be worn for at least 4 hrs each night on 70% of the nights for 30 consecutive days during the initial 3 months from set up.  If you are having mask issues please contact the medical equipment company ASAP.  You only have 30 days to exchange for a different mask to be covered by insurance.  Do not wait until your follow up to discuss mask issues because it may be too late to exchange for a new mask to be covered by your insurance.    You will need to have a face to face visit (in person or video) with the medical provider who ordered the equipment between the 31st and 91st day from set up.  If you have do not have an appointment please call our office.      SLEEP HYGIENE GUIDELINES    Regular bedtime and rise time  Having a consistent bedtime and rise time leads to more regular sleep schedules and avoids periods of sleep deprivation or periods of extended wakefulness during the night  Avoid napping  Especially those lasting longer than 1 hour and naps late in the day  Limit caffeine  Avoid caffeine after lunch  Limit alcohol  Avoid near bedtime. Alcohol is initially sedating, but activating as  it is metabolized. It negatively impacts sleep quality  Avoid nicotine  This is a stimulant. Avoid at night or near bedtime  Exercise  Daytime exercise is encouraged; 4-6 hours before bedtime  Rigorous exercise within 2 ours of bedtime is discouraged  Keep the sleep environment quiet and dark  Noise and light exposure can disrupt sleep  White noise or ear plugs can reduce noise  Blackout shades or eye masks  Avoid television or technology near bedtime as this can impact circadian rhythm  Bedroom clock  Avoid checking the time at night. This increases cognitive arousal and prolongs wakefulness  Evening eating  Avoid a large meal close to bedtime.   Avoid late night snacks  Get out of bed if you're not asleep within 20 minutes and return to bed when you are drowsy    2023 UpToDate    Weight Management  - calorie deficit of a reduction of ~ 500 calories per day from your normal intake  - 150 minutes of moderate intensity physical activity per week  Moderate-intensity aerobic physical activity means you're working hard enough to breathe harder, raise your heart rate, and break a sweat. You'll notice that you'll be able to talk, but not sing the words to your favorite song.  Walking fast  Doing water aerobics/swimming  Riding a bike on level ground or with few hills  Playing tennis  Pushing a   Raking leaves    COGNITIVE BEHAVIORAL THERAPY FOR INSOMNIA (CBT-I)  CBT-I is the recommended first-line treatment for chronic insomnia.  It addresses common thoughts and behaviors that interfere with optimal sleep.    You have been referred to Suzanna Simon. She is a sleep psychologist that specializes in cognitive behavioral therapy for insomnia. Sleep psychology is a subspecialty of sleep medicine that focuses on the psychological and behavioral components of sleep disorders and their treatments     Please call for an appointment: [phone].        Yes